# Patient Record
Sex: MALE | Race: WHITE | NOT HISPANIC OR LATINO | URBAN - METROPOLITAN AREA
[De-identification: names, ages, dates, MRNs, and addresses within clinical notes are randomized per-mention and may not be internally consistent; named-entity substitution may affect disease eponyms.]

---

## 2019-07-22 ENCOUNTER — IMPORTED ENCOUNTER (OUTPATIENT)
Dept: URBAN - METROPOLITAN AREA CLINIC 50 | Facility: CLINIC | Age: 74
End: 2019-07-22

## 2019-07-23 ENCOUNTER — IMPORTED ENCOUNTER (OUTPATIENT)
Dept: URBAN - METROPOLITAN AREA CLINIC 50 | Facility: CLINIC | Age: 74
End: 2019-07-23

## 2020-07-28 ENCOUNTER — IMPORTED ENCOUNTER (OUTPATIENT)
Dept: URBAN - METROPOLITAN AREA CLINIC 50 | Facility: CLINIC | Age: 75
End: 2020-07-28

## 2021-04-18 ASSESSMENT — TONOMETRY
OS_IOP_MMHG: 17
OD_IOP_MMHG: 18
OD_IOP_MMHG: 17
OS_IOP_MMHG: 17

## 2021-04-18 ASSESSMENT — VISUAL ACUITY
OD_CC: 20/20-2
OS_CC: 20/25-2
OS_CC: J1+@ 15 IN
OS_CC: 20/30+2
OS_CC: J1+
OD_BAT: 20/25
OD_CC: J1+@ 15 IN
OS_BAT: 20/30-2
OD_CC: 20/30+2
OD_CC: J1+
OS_BAT: 20/50
OD_BAT: 20/20-2
OS_OTHER: 20/30-2. 20/60.
OD_OTHER: 20/20-2. 20/25.
OD_OTHER: 20/25. 20/30.
OS_OTHER: 20/50. 20/60.

## 2021-11-19 ENCOUNTER — PREPPED CHART (OUTPATIENT)
Dept: URBAN - METROPOLITAN AREA CLINIC 49 | Facility: CLINIC | Age: 76
End: 2021-11-19

## 2021-11-19 NOTE — PATIENT DISCUSSION
"""Informed patient that their capsular opacification is not visually significant or does not meet the minimum criteria for capsulotomy.  Recommended attention to PCO symptoms 1 person assist

## 2021-11-24 ENCOUNTER — ROUTINE EXAM (OUTPATIENT)
Dept: URBAN - METROPOLITAN AREA CLINIC 49 | Facility: CLINIC | Age: 76
End: 2021-11-24

## 2021-11-24 DIAGNOSIS — H35.363: ICD-10-CM

## 2021-11-24 DIAGNOSIS — Z01.01: ICD-10-CM

## 2021-11-24 PROCEDURE — 92014 COMPRE OPH EXAM EST PT 1/>: CPT

## 2021-11-24 PROCEDURE — 92015 DETERMINE REFRACTIVE STATE: CPT

## 2021-11-24 PROCEDURE — 92134 CPTRZ OPH DX IMG PST SGM RTA: CPT

## 2021-11-24 ASSESSMENT — VISUAL ACUITY
OD_GLARE: 20/40
OD_CC: 20/40
OU_CC: J1+@16"
OD_GLARE: 20/30
OS_GLARE: 20/50
OS_CC: 20/25-2
OS_GLARE: 20/40

## 2021-11-24 ASSESSMENT — TONOMETRY
OS_IOP_MMHG: 15
OD_IOP_MMHG: 15

## 2023-08-03 ENCOUNTER — COMPREHENSIVE EXAM (OUTPATIENT)
Dept: URBAN - METROPOLITAN AREA CLINIC 49 | Facility: LOCATION | Age: 78
End: 2023-08-03

## 2023-08-03 DIAGNOSIS — H35.3131: ICD-10-CM

## 2023-08-03 DIAGNOSIS — H26.493: ICD-10-CM

## 2023-08-03 DIAGNOSIS — H02.834: ICD-10-CM

## 2023-08-03 DIAGNOSIS — H43.813: ICD-10-CM

## 2023-08-03 DIAGNOSIS — H02.831: ICD-10-CM

## 2023-08-03 PROCEDURE — 92015 DETERMINE REFRACTIVE STATE: CPT

## 2023-08-03 PROCEDURE — 92134 CPTRZ OPH DX IMG PST SGM RTA: CPT

## 2023-08-03 PROCEDURE — 99214 OFFICE O/P EST MOD 30 MIN: CPT

## 2023-08-03 ASSESSMENT — VISUAL ACUITY
OD_GLARE: 20/80
OD_GLARE: 20/40
OS_GLARE: 20/25-1
OD_CC: 20/30-2
OD_PH: 20/30+2
OS_GLARE: 20/30-1
OS_CC: 20/25

## 2023-08-03 ASSESSMENT — TONOMETRY
OS_IOP_MMHG: 15
OD_IOP_MMHG: 15

## 2024-08-08 ENCOUNTER — COMPREHENSIVE EXAM (OUTPATIENT)
Dept: URBAN - METROPOLITAN AREA CLINIC 49 | Facility: LOCATION | Age: 79
End: 2024-08-08

## 2024-08-08 DIAGNOSIS — H52.4: ICD-10-CM

## 2024-08-08 DIAGNOSIS — H43.813: ICD-10-CM

## 2024-08-08 DIAGNOSIS — H35.3131: ICD-10-CM

## 2024-08-08 DIAGNOSIS — H35.363: ICD-10-CM

## 2024-08-08 DIAGNOSIS — H26.493: ICD-10-CM

## 2024-08-08 DIAGNOSIS — H04.123: ICD-10-CM

## 2024-08-08 DIAGNOSIS — H02.834: ICD-10-CM

## 2024-08-08 DIAGNOSIS — H02.831: ICD-10-CM

## 2024-08-08 DIAGNOSIS — H18.593: ICD-10-CM

## 2024-08-08 PROCEDURE — 92015 DETERMINE REFRACTIVE STATE: CPT

## 2024-08-08 PROCEDURE — 99214 OFFICE O/P EST MOD 30 MIN: CPT

## 2024-08-08 PROCEDURE — 92134 CPTRZ OPH DX IMG PST SGM RTA: CPT

## 2024-08-08 ASSESSMENT — VISUAL ACUITY
OD_GLARE: 20/40
OS_CC: 20/20
OD_GLARE: 20/60
OU_CC: 20/20
OS_GLARE: 20/50
OS_GLARE: 20/80
OU_CC: J1+
OD_CC: 20/20-2

## 2024-08-08 ASSESSMENT — TONOMETRY
OD_IOP_MMHG: 14
OS_IOP_MMHG: 11